# Patient Record
Sex: MALE | Race: WHITE | NOT HISPANIC OR LATINO | URBAN - METROPOLITAN AREA
[De-identification: names, ages, dates, MRNs, and addresses within clinical notes are randomized per-mention and may not be internally consistent; named-entity substitution may affect disease eponyms.]

---

## 2020-02-02 ENCOUNTER — EMERGENCY (EMERGENCY)
Facility: HOSPITAL | Age: 50
LOS: 1 days | Discharge: HOME | End: 2020-02-02
Attending: EMERGENCY MEDICINE | Admitting: EMERGENCY MEDICINE
Payer: COMMERCIAL

## 2020-02-02 VITALS
TEMPERATURE: 98 F | DIASTOLIC BLOOD PRESSURE: 91 MMHG | HEIGHT: 69 IN | SYSTOLIC BLOOD PRESSURE: 148 MMHG | HEART RATE: 55 BPM | RESPIRATION RATE: 18 BRPM | OXYGEN SATURATION: 97 % | WEIGHT: 154.98 LBS

## 2020-02-02 DIAGNOSIS — R21 RASH AND OTHER NONSPECIFIC SKIN ERUPTION: ICD-10-CM

## 2020-02-02 PROCEDURE — 99282 EMERGENCY DEPT VISIT SF MDM: CPT

## 2020-02-02 PROCEDURE — 99283 EMERGENCY DEPT VISIT LOW MDM: CPT

## 2020-02-02 RX ORDER — DIPHENHYDRAMINE HCL 50 MG
25 CAPSULE ORAL ONCE
Refills: 0 | Status: COMPLETED | OUTPATIENT
Start: 2020-02-02 | End: 2020-02-02

## 2020-02-02 RX ADMIN — Medication 25 MILLIGRAM(S): at 23:46

## 2020-02-02 NOTE — ED ADULT NURSE NOTE - NSIMPLEMENTINTERV_GEN_ALL_ED
Implemented All Universal Safety Interventions:  [place] to call system. Call bell, personal items and telephone within reach. Instruct patient to call for assistance. Room bathroom lighting operational. Non-slip footwear when patient is off stretcher. Physically safe environment: no spills, clutter or unnecessary equipment. Stretcher in lowest position, wheels locked, appropriate side rails in place.

## 2020-02-02 NOTE — ED PROVIDER NOTE - CLINICAL SUMMARY MEDICAL DECISION MAKING FREE TEXT BOX
51 y/o M with no significant PMHx presents to the ED with macular papular itchy rash x 1 week. Rash with no concerning features, patient afebrile and nontoxic. Rash appears suspect for insect bites vs contact dermatitis, recommended OTC benadryl and/or Cortizone cream. Patient ready for DC. 51 y/o M with no significant PMHx presents to the ED with macular papular itchy rash x 1 week. Rash with no concerning features, patient afebrile and nontoxic, very well-appearing. Suspect rash due to insect bites vs contact dermatitis, recommended OTC benadryl and/or Cortizone cream. F/u with PMD or derm, and return to ed for any concerning or worsening sx. The patient is stable for DC and is feeling much better.  Symptoms have improved and after discussion regarding discharge, patient feels comfortable going home.  Answers to all questions provided.  Patient feeling satisfactory with care and follow up plan discussed. They were advised to call their PMD for prompt outpatient follow up. Return precautions were discussed. The patient was advised to return to the ER for any concerning or worsening symptoms.

## 2020-02-02 NOTE — ED PROVIDER NOTE - PATIENT PORTAL LINK FT
You can access the FollowMyHealth Patient Portal offered by [facility] by registering at the following website: [email or web address]. By joining [employer]’s FollowMyHealth portal, you will also be able to view your health information using other applications (apps) compatible with our system.

## 2020-02-02 NOTE — ED PROVIDER NOTE - PHYSICAL EXAMINATION
GEN: Well appearing, well nourished, awake, alert, oriented to person, place, time/situation and in no apparent distress.  ENT: Airway patent, Nasal mucosa clear. Mouth with normal mucosa.  EYES: Clear bilaterally.  RESPIRATORY: Breathing comfortably with normal RR.  CARDIAC: Regular rate and rhythm  ABDOMEN: Soft, nontender, +bowel sounds, no rebound, rigidity, or guarding.  MSK: Range of motion is not limited, no deformities noted.  NEURO: Alert and oriented, no focal deficits.  SKIN: Skin normal color for race, warm, dry and intact. No evidence of rash.  PSYCH: Alert and oriented to person, place, time/situation. normal mood and affect. no apparent risk to self or others. GEN: Well appearing, well nourished, awake, alert, oriented to person, place, time/situation and in no apparent distress.  ENT: Airway patent, Nasal mucosa clear. Mouth with normal mucosa.  EYES: Clear bilaterally.  RESPIRATORY: Breathing comfortably with normal RR.  CARDIAC: Regular rate and rhythm  ABDOMEN: Soft, nontender, +bowel sounds, no rebound, rigidity, or guarding.  MSK: Range of motion is not limited, no deformities noted.  NEURO: Alert and oriented, no focal deficits.  SKIN: Skin normal color for race, warm, dry and intact. scattered, excoriated maculopapular rash to right back, crosses midline with fewer bumps on left side. Few scattered bums to extremities and abd. No crepitus or gangrene, neg nikolsky's, no petechia or purpura.   PSYCH: Alert and oriented to person, place, time/situation. normal mood and affect. no apparent risk to self or others.

## 2020-02-02 NOTE — ED PROVIDER NOTE - OBJECTIVE STATEMENT
51 y/o M with no PMHx presents to the ED with itchy rash on back. Patient states that he returned from [place] in the [place] 1 week ago and his wife noticed bug bites on his legs and abdomen, but was itching his back today and noticed bumps generalized over his back that were "sensitive" and itchy. Patient also states that he was at his vacation house for the last 2 days. Denies fever, chills, sinus congestion, cough, abdominal pain, or any other actue complaints. 51 y/o M with no PMHx presents to the ED with itchy rash on back. Patient states that he returned from [place] (hiking in the jungle) 1 week ago and his wife noticed bug bites on his legs and abdomen, but was itching his back today and noticed bumps over right side (less on left) his back that were "sensitive" and itchy. Patient also states that he was at his vacation house for the last 2 days, but he is certain the bumps started before. Denies fever, chills, cough, abdominal pain, neck pain or any other acute complaints.

## 2020-02-02 NOTE — ED PROVIDER NOTE - NSFOLLOWUPINSTRUCTIONS_ED_ALL_ED_FT
Please follow up with your primary care physician. If you have any problem getting an appointment please call the ED Referral Coordinator at [phone].  Return to the Emergency Department if you have any new or worsening symptoms, or for any other concerns. Please read below for further information.    Please take 25-50mg benadryl by mouth for itching and /or use topical hydrocortisone cream (0.5 - 1%) applied to rash for itch.    Rash    A rash is a change in the color of the skin. A rash can also change the way your skin feels. There are many different conditions and factors that can cause a rash, most of which are not dangerous. Make sure to follow up with your primary care physician or a dermatologist as instructed by your health care provider.    SEEK IMMEDIATE MEDICAL CARE IF YOU HAVE ANY OF THE FOLLOWING SYMPTOMS: fever, blisters, a rash inside your mouth, vaginal or anal pain, or altered mental status.  Rash, Adult  A rash is a change in the color of your skin. A rash can also change the way your skin feels. There are many different conditions and factors that can cause a rash. Some rashes may disappear after a few days, but some may last for a few weeks. Common causes of rashes include:  Viral infections, such as:  Colds.Measles.Hand, foot, and mouth disease.Bacterial infections, such as:  Scarlet fever.Impetigo.Fungal infections, such as Candida.Allergic reactions to food, medicines, or skin care products.Follow these instructions at home:  The goal of treatment is to stop the itching and keep the rash from spreading. Pay attention to any changes in your symptoms. Follow these instructions to help with your condition:  Medicine     Take or apply over-the-counter and prescription medicines only as told by your health care provider. These may include:  Corticosteroid creams to treat red or swollen skin.Anti-itch lotions.Oral allergy medicines (antihistamines).Oral corticosteroids for severe symptoms.Skin care     Apply cool compresses to the affected areas.Do not scratch or rub your skin.Avoid covering the rash. Make sure the rash is exposed to air as much as possible.Managing itching and discomfort     Avoid hot showers or baths, which can make itching worse. A cold shower may help.Try taking a bath with:  Epsom salts. Follow  instructions on the packaging. You can get these at your local pharmacy or grocery store.Baking soda. Pour a small amount into the bath as told by your health care provider.Colloidal oatmeal. Follow  instructions on the packaging. You can get this at your local pharmacy or grocery store.Try applying baking soda paste to your skin. Stir water into baking soda until it reaches a paste-like consistency.Try applying calamine lotion. This is an over-the-counter lotion that helps to relieve itchiness.Keep cool and out of the sun. Sweating and being hot can make itching worse.General instructions        Rest as needed.Drink enough fluid to keep your urine pale yellow.Wear loose-fitting clothing.Avoid scented soaps, detergents, and perfumes. Use gentle soaps, detergents, perfumes, and other cosmetic products.Avoid any substance that causes your rash. Keep a journal to help track what causes your rash. Write down:  What you eat.What cosmetic products you use.What you drink.What you wear. This includes jewelry.Keep all follow-up visits as told by your health care provider. This is important.Contact a health care provider if:  You sweat at night.You lose weight.You urinate more than normal.You urinate less than normal, or you notice that your urine is a darker color than usual.You feel weak.You vomit.Your skin or the whites of your eyes look yellow (jaundice).Your skin:  Tingles.Is numb.Your rash:  Does not go away after several days.Gets worse.You are:  Unusually thirsty.More tired than normal.You have:  New symptoms.Pain in your abdomen.A fever.Diarrhea.Get help right away if you:  Have a fever and your symptoms suddenly get worse.Develop confusion.Have a severe headache or a stiff neck.Have severe joint pains or stiffness.Have a seizure.Develop a rash that covers all or most of your body. The rash may or may not be painful.Develop blisters that:  Are on top of the rash.Grow larger or grow together.Are painful.Are inside your nose or mouth.Develop a rash that:  Looks like purple pinprick-sized spots all over your body.Has a "bull's eye" or looks like a target.Is not related to sun exposure, is red and painful, and causes your skin to peel.Summary  A rash is a change in the color of your skin. Some rashes disappear after a few days, but some may last for a few weeks.The goal of treatment is to stop the itching and keep the rash from spreading.Take or apply over-the-counter and prescription medicines only as told by your health care provider.Contact a health care provider if you have new or worsening symptoms.Keep all follow-up visits as told by your health care provider. This is important.This information is not intended to replace advice given to you by your health care provider. Make sure you discuss any questions you have with your health care provider.

## 2020-10-28 ENCOUNTER — EMERGENCY (EMERGENCY)
Facility: HOSPITAL | Age: 50
LOS: 1 days | Discharge: HOME | End: 2020-10-28
Attending: EMERGENCY MEDICINE | Admitting: EMERGENCY MEDICINE
Payer: COMMERCIAL

## 2020-10-28 VITALS
TEMPERATURE: 98 F | WEIGHT: 160.06 LBS | DIASTOLIC BLOOD PRESSURE: 87 MMHG | HEART RATE: 74 BPM | RESPIRATION RATE: 18 BRPM | SYSTOLIC BLOOD PRESSURE: 130 MMHG | OXYGEN SATURATION: 96 % | HEIGHT: 69 IN
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
## 2020-10-28 DIAGNOSIS — S01.01XA LACERATION WITHOUT FOREIGN BODY OF SCALP, INITIAL ENCOUNTER: ICD-10-CM

## 2020-10-28 DIAGNOSIS — Z23 ENCOUNTER FOR IMMUNIZATION: ICD-10-CM

## 2020-10-28 DIAGNOSIS — S09.90XA UNSPECIFIED INJURY OF HEAD, INITIAL ENCOUNTER: ICD-10-CM
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
## 2020-10-28 PROCEDURE — 99285 EMERGENCY DEPT VISIT HI MDM: CPT | Mod: 25

## 2020-10-28 PROCEDURE — 12001 RPR S/N/AX/GEN/TRNK 2.5CM/<: CPT

## 2020-10-29 VITALS
TEMPERATURE: 98 F | RESPIRATION RATE: 17 BRPM | DIASTOLIC BLOOD PRESSURE: 78 MMHG | OXYGEN SATURATION: 99 % | HEART RATE: 80 BPM | SYSTOLIC BLOOD PRESSURE: 126 MMHG

## 2020-10-29 PROBLEM — Z78.9 OTHER SPECIFIED HEALTH STATUS: Chronic | Status: ACTIVE | Noted: 2020-02-02

## 2020-10-29 PROCEDURE — 70450 CT HEAD/BRAIN W/O DYE: CPT

## 2020-10-29 PROCEDURE — 99284 EMERGENCY DEPT VISIT MOD MDM: CPT | Mod: 25

## 2020-10-29 PROCEDURE — 70450 CT HEAD/BRAIN W/O DYE: CPT | Mod: 26

## 2020-10-29 PROCEDURE — 90715 TDAP VACCINE 7 YRS/> IM: CPT

## 2020-10-29 PROCEDURE — 72125 CT NECK SPINE W/O DYE: CPT | Mod: 26

## 2020-10-29 PROCEDURE — 12001 RPR S/N/AX/GEN/TRNK 2.5CM/<: CPT

## 2020-10-29 PROCEDURE — 90471 IMMUNIZATION ADMIN: CPT

## 2020-10-29 PROCEDURE — 72125 CT NECK SPINE W/O DYE: CPT

## 2020-10-29 PROCEDURE — 82962 GLUCOSE BLOOD TEST: CPT

## 2020-10-29 RX ORDER — TETANUS TOXOID, REDUCED DIPHTHERIA TOXOID AND ACELLULAR PERTUSSIS VACCINE, ADSORBED 5; 2.5; 8; 8; 2.5 [IU]/.5ML; [IU]/.5ML; UG/.5ML; UG/.5ML; UG/.5ML
0.5 SUSPENSION INTRAMUSCULAR ONCE
Refills: 0 | Status: COMPLETED | OUTPATIENT
Start: 2020-10-29 | End: 2020-10-29

## 2020-10-29 RX ADMIN — TETANUS TOXOID, REDUCED DIPHTHERIA TOXOID AND ACELLULAR PERTUSSIS VACCINE, ADSORBED 0.5 MILLILITER(S): 5; 2.5; 8; 8; 2.5 SUSPENSION INTRAMUSCULAR at 02:13

## 2020-10-29 NOTE — ED PROVIDER NOTE - NSFOLLOWUPINSTRUCTIONS_ED_ALL_ED_FT
Apply bacitracin ointment 2-3 times/day.  Follow up with your doctor in 1-3 days.  Return to ER for staple removal in 7 days.  =============================    Head Injury    WHAT YOU NEED TO KNOW:    A head injury can include your scalp, face, skull, or brain and range from mild to severe. Effects can appear immediately after the injury or develop later. The effects may last a short time or be permanent. Healthcare providers may want to check your recovery over time. Treatment may change as you recover or develop new health problems from the head injury.    DISCHARGE INSTRUCTIONS:    Call your local emergency number (911 in the [place]), or have someone else call if:   •You cannot be woken.      •You have a seizure.      •You stop responding to others or you faint.      •You have blurry or double vision.      •Your speech becomes slurred or confused.      •You have arm or leg weakness, loss of feeling, or new problems with coordination.      •Your pupils are larger than usual, or one pupil is a different size than the other.      •You have blood or clear fluid coming out of your ears or nose.      Return to the emergency department if:   •You have repeated or forceful vomiting.      •You feel confused.      •Your headache gets worse or becomes severe.      •You or someone caring for you notices that you are harder to wake than usual.      Call your doctor if:   •Your symptoms last longer than 6 weeks after the injury.      •You have questions or concerns about your condition or care.      Medicines:   •Acetaminophen decreases pain and fever. It is available without a doctor's order. Ask how much to take and how often to take it. Follow directions. Read the labels of all other medicines you are using to see if they also contain acetaminophen, or ask your doctor or pharmacist. Acetaminophen can cause liver damage if not taken correctly. Do not use more than 4 grams (4,000 milligrams) total of acetaminophen in one day.       •Take your medicine as directed. Contact your healthcare provider if you think your medicine is not helping or if you have side effects. Tell him or her if you are allergic to any medicine. Keep a list of the medicines, vitamins, and herbs you take. Include the amounts, and when and why you take them. Bring the list or the pill bottles to follow-up visits. Carry your medicine list with you in case of an emergency.      Self-care:   •Rest or do quiet activities. Limit your time watching TV, using the computer, or doing tasks that require a lot of thinking. Slowly return to your normal activities as directed. Do not play sports or do activities that may cause you to get hit in the head. Ask your healthcare provider when you can return to sports.      •Apply ice on your head for 15 to 20 minutes every hour or as directed. Use an ice pack, or put crushed ice in a plastic bag. Cover it with a towel before you apply it to your skin. Ice helps prevent tissue damage and decreases swelling and pain.      •Have someone stay with you for 24 hours , or as directed. This person can monitor you for problems and call for help if needed. When you are awake, the person should ask you a few questions every few hours to see if you are thinking clearly. An example is to ask your name or address.      Prevent another head injury:   •Wear a helmet that fits properly. Do this when you play sports, or ride a bike, scooter, or skateboard. Helmets help decrease your risk for a serious head injury. Talk to your healthcare provider about other ways you can protect yourself if you play sports.      •Wear your seatbelt every time you are in a car. This helps lower your risk for a head injury if you are in a car accident.      Follow up with your doctor as directed: Write down your questions so you remember to ask them during your visits.  ============================    Staple Care    WHAT YOU NEED TO KNOW:    Staples are often used to close a wound. Your staples may be placed for 3 to 14 days, depending on the location of your wound.    DISCHARGE INSTRUCTIONS:    Care for your wound:   •Clean: ?You may be able to shower in 24 hours. Do not soak your wound under water.      ?Gently wash your wound with soap and warm water daily. Lightly pat it dry. Do not cover your wound unless your healthcare provider tells you to.       ?You may also need to clean your wound with a mixture of hydrogen peroxide and water. Ask how to do this.      ?Do not apply ointment or cream to the wound unless your healthcare provider tells you to.      •Elevate: ?Rest any arm or leg that has a wound on pillows above the level of your heart. Do this as often as possible for 2 days. This will help decrease swelling and pain, and help you heal faster.          •Minimize scarring: ?Avoid sunshine on your wound to reduce scarring.             Follow up with your healthcare provider as directed: You may need to return for a wound checkup 3 days after your staples are placed. Ask when you should return to get your staples removed.    Staple removal:   •A medical staple remover will be used to take out your staples. Your healthcare provider will slide the tool under each staple, squeeze the handle, and gently pull the staple out.       •Medical tape will be placed on your wound once your staples are removed. This will help keep your wound closed. The medical tape will fall off on its own after several days.       Contact your healthcare provider if:   •You have redness, pain, swelling, and pus draining from your wound.      •Your pain medicine does not relieve your pain.      •You have a fever of 101°F (38.5°C) or higher.      •You have an odor coming from your wound.      •You have questions or concerns about your condition or care.      Return to the emergency department if:   •Your wound reopens.      •You have red streaks in your skin that spread out from your wound.      •You have severe pain or vomiting.

## 2020-10-29 NOTE — ED PROVIDER NOTE - CLINICAL SUMMARY MEDICAL DECISION MAKING FREE TEXT BOX
Fall assoc with alcohol intoxication.  Intoxication appears mild on initial exam. Nonfocal neuro exam. Unknown tetanus status, will give booster.  Low susp for c-spine injury.  Plan: CT head and c-spine. Laceration repair.  Monitor sobriety.
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
## 2020-10-29 NOTE — ED PROVIDER NOTE - PROGRESS NOTE DETAILS
Alert and oriented x 3.  Speech completely clear.  Gait steady.  Wife with patient and feels comfortable bringing him home.

## 2020-10-29 NOTE — ED PROVIDER NOTE - PHYSICAL EXAMINATION
GEN: WD/WN, NAD  HEAD:  1 cm left posterior parietal scalp laceration, not actively bleeding; no periorbital ecchymosis or Cho's sign  NEURO: Alert, oriented. CN II-XII intact. Clear slightly slurred.  SILT all ext. Motor 5/5 all ext. Gait steady.   EYE: PERRL, EOMI.   ENT: Airway patent.  No dental injury. No epistaxis or rhinorrhea. No otorrhea or hemotympanum.  PULM: No resp distress. Lungs CTA bilat.  CV: RRR, S1S2  GI: Abdomen soft, nontender  MSK: Neck with painless ROM; no midline neck tenderness.  Extremities without tenderness, swelling, or ROM limitation.  SKIN: Intact.  Normal color and turgor. GEN: WD/WN, NAD.  Pleasant.  HEAD:  1 cm left posterior parietal scalp laceration, not actively bleeding; no periorbital ecchymosis or Cho's sign  NEURO: Alert, oriented x 3. CN II-XII intact. Clear slightly slurred.  SILT all ext. Motor 5/5 all ext. Gait steady.   EYE: PERRL, EOMI.   ENT: Airway patent.  No dental injury. No epistaxis or rhinorrhea. No otorrhea or hemotympanum.  PULM: No resp distress. Lungs CTA bilat.  CV: RRR, S1S2  GI: Abdomen soft, nontender  MSK: Neck with painless ROM; no midline neck tenderness.  Extremities without tenderness, swelling, or ROM limitation.  SKIN: Intact.  Normal color and turgor.

## 2020-10-29 NOTE — ED PROVIDER NOTE - PATIENT PORTAL LINK FT
You can access the FollowMyHealth Patient Portal offered by [facility] by registering at the following website: [email or web address]. By joining [employer]’s FollowMyHealth portal, you will also be able to view your health information using other applications (apps) compatible with our system.
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
## 2020-10-29 NOTE — ED ADULT NURSE NOTE - NSIMPLEMENTINTERV_GEN_ALL_ED
Implemented All Fall Risk Interventions:  [place] to call system. Call bell, personal items and telephone within reach. Instruct patient to call for assistance. Room bathroom lighting operational. Non-slip footwear when patient is off stretcher. Physically safe environment: no spills, clutter or unnecessary equipment. Stretcher in lowest position, wheels locked, appropriate side rails in place. Provide visual cue, wrist band, yellow gown, etc. Monitor gait and stability. Monitor for mental status changes and reorient to person, place, and time. Review medications for side effects contributing to fall risk. Reinforce activity limits and safety measures with patient and family.

## 2023-07-05 NOTE — ED PROVIDER NOTE - HISTORY ATTESTATION, MLM
I have reviewed and confirmed nurses' notes...
Price (Use Numbers Only, No Special Characters Or $): 00
Number Of Layers: 3
Prep: The treated area was degreased with pre-peel cleanser, and vaseline was applied for protection of mucous membranes.
Chemical Peel: TCA 15%
Detail Level: Zone
Post Peel Care: After the procedure, a post-peel cream was applied to the treated areas. Sun protection and post-care instructions were reviewed with the patient.
Post-Care Instructions: I reviewed with the patient in detail post-care instructions. Patient should avoid sun exposure and wear sun protection.
Treatment Number: 0
Consent: Prior to the procedure, written consent was obtained and risks were reviewed, including but not limited to: redness, peeling, blistering, pigmentary change, scarring, infection, and pain.

## 2025-04-03 NOTE — ED ADULT TRIAGE NOTE - IDEAL BODY WEIGHT(KG)
71 Bactrim Pregnancy And Lactation Text: This medication is Pregnancy Category D and is known to cause fetal risk.  It is also excreted in breast milk.